# Patient Record
Sex: MALE | Race: WHITE | NOT HISPANIC OR LATINO | Employment: PART TIME | URBAN - METROPOLITAN AREA
[De-identification: names, ages, dates, MRNs, and addresses within clinical notes are randomized per-mention and may not be internally consistent; named-entity substitution may affect disease eponyms.]

---
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
## 2019-01-07 ENCOUNTER — TELEPHONE (OUTPATIENT)
Dept: PEDIATRICS | Facility: CLINIC | Age: 19
End: 2019-01-07

## 2019-01-07 NOTE — TELEPHONE ENCOUNTER
----- Message from Kusum Cagle sent at 1/7/2019  4:10 PM CST -----  Contact: Grandmother Esther Reardon [phone]  She is requesting the record of his sickle cell shot papers.  Please fax it to [phone].  Thank you!

## 2019-01-07 NOTE — TELEPHONE ENCOUNTER
Informed gmaw labs are going to be faxed over to number provided     gmaw Confirmed understanding     No further questions

## 2019-01-11 ENCOUNTER — OFFICE VISIT (OUTPATIENT)
Dept: PEDIATRICS | Facility: CLINIC | Age: 19
End: 2019-01-11
Payer: COMMERCIAL

## 2019-01-11 VITALS
WEIGHT: 173.94 LBS | HEART RATE: 65 BPM | SYSTOLIC BLOOD PRESSURE: 122 MMHG | DIASTOLIC BLOOD PRESSURE: 82 MMHG | RESPIRATION RATE: 18 BRPM | HEIGHT: 70 IN | BODY MASS INDEX: 24.9 KG/M2 | TEMPERATURE: 99 F

## 2019-01-11 DIAGNOSIS — Z00.00 WELL ADULT EXAM: Primary | ICD-10-CM

## 2019-01-11 PROCEDURE — 90633 HEPATITIS A VACCINE PEDIATRIC / ADOLESCENT 2 DOSE IM: ICD-10-PCS | Mod: S$GLB,,, | Performed by: PEDIATRICS

## 2019-01-11 PROCEDURE — 90633 HEPA VACC PED/ADOL 2 DOSE IM: CPT | Mod: S$GLB,,, | Performed by: PEDIATRICS

## 2019-01-11 PROCEDURE — 90471 IMMUNIZATION ADMIN: CPT | Mod: S$GLB,,, | Performed by: PEDIATRICS

## 2019-01-11 PROCEDURE — 90686 FLU VACCINE (QUAD) GREATER THAN OR EQUAL TO 3YO PRESERVATIVE FREE IM: ICD-10-PCS | Mod: S$GLB,,, | Performed by: PEDIATRICS

## 2019-01-11 PROCEDURE — 99999 PR PBB SHADOW E&M-EST. PATIENT-LVL IV: CPT | Mod: PBBFAC,,, | Performed by: PEDIATRICS

## 2019-01-11 PROCEDURE — 90471 FLU VACCINE (QUAD) GREATER THAN OR EQUAL TO 3YO PRESERVATIVE FREE IM: ICD-10-PCS | Mod: S$GLB,,, | Performed by: PEDIATRICS

## 2019-01-11 PROCEDURE — 90472 HEPATITIS A VACCINE PEDIATRIC / ADOLESCENT 2 DOSE IM: ICD-10-PCS | Mod: S$GLB,,, | Performed by: PEDIATRICS

## 2019-01-11 PROCEDURE — 90472 IMMUNIZATION ADMIN EACH ADD: CPT | Mod: S$GLB,,, | Performed by: PEDIATRICS

## 2019-01-11 PROCEDURE — 99999 PR PBB SHADOW E&M-EST. PATIENT-LVL IV: ICD-10-PCS | Mod: PBBFAC,,, | Performed by: PEDIATRICS

## 2019-01-11 PROCEDURE — 99395 PR PREVENTIVE VISIT,EST,18-39: ICD-10-PCS | Mod: 25,S$GLB,, | Performed by: PEDIATRICS

## 2019-01-11 PROCEDURE — 90686 IIV4 VACC NO PRSV 0.5 ML IM: CPT | Mod: S$GLB,,, | Performed by: PEDIATRICS

## 2019-01-11 PROCEDURE — 99395 PREV VISIT EST AGE 18-39: CPT | Mod: 25,S$GLB,, | Performed by: PEDIATRICS

## 2019-01-11 NOTE — PROGRESS NOTES
Here for well check   No concerns  ALL:reviewed  MEDS:reviewed  IMM: need hep A #2  PMH: problem list reviewed  FH: reviewed  LEAD & TB risk: negative  Home: lives with parents and sisters  Education: attends [employer]   Acitvities:plans to try out for football  Diet: good appetite, variety of foods, milk, water, occ. Sugary drinks  Dental: yes  Driving: yes  Drugs/Etoh/Tobacco: occ. Alcohol, has smoked weed prior  Sex: yes  ROS   GEN:sleeps well, no fever or wt loss   SKIN:no infection, rash, bruising or swelling   HEENT:hears and sees well, no eye, ear, nose d/c or pain, no ST, neck injury, pain or masses   CHEST:normal breathing, no cough or CP with exertion   CV:no fatigue, cyanosis, dizziness, palpitations   ABD:nl BMs; no vomiting,no diarrhea,no pain    :nl urination, no dysuria, blood or frequency   GYN:no genital problems   MS:nl movements and gait, no swelling or pain   NEURO:no HA, weakness, incoordination, concussion Hx or spells   PSYCH:no behavior problem, depression, anxiety    PHYSICAL:nl VS See Growth Chart.   GEN: alert, active, cooperative.Pain 0/10    SKIN:no rash, pallor, bruising or edema   HEAD:NCAT   EYE:EOMI, PERRLA, clear conjunctiva   EAR:clear canals, nl pinnae and TMs   NOSE:patent, no d/c, midline septum   MOUTH:nl teeth and gums, clear pharynx   NECK:nl ROM, no mass or thyromegaly   CHEST:nl chest wall, resp effort, clear BBS   CV:RRR, no murmur, nl S1S2, no edema   ABD:nl BS, ND, soft, NT; no HSM, mass    :nl anatomy, no mass or hernia    MS:nl ROM, no deformity or instability, nl gait, no scoliosis, no CCE   NEURO:nl tone and strength    Addison was seen today for well child.    Diagnoses and all orders for this visit:    Well adult exam  -     C. trachomatis/N. gonorrhoeae by AMP DNA  -     (In Office Administered) Hepatitis A Vaccine (Pediatric/Adolescent) (2 Dose) (IM)  -     Influenza - Quadrivalent (3 years & older) (PF)      Dental hygiene discussed  Nutrition and exercise  reviewed  Interpretive conference conducted.   Immunizations reviewed. Flu vaccine and Hep A #2 today  Vision Passed  F/U annually & prn

## 2021-07-26 ENCOUNTER — IMMUNIZATION (OUTPATIENT)
Dept: FAMILY MEDICINE | Facility: CLINIC | Age: 21
End: 2021-07-26
Payer: OTHER GOVERNMENT

## 2021-07-26 DIAGNOSIS — Z23 NEED FOR VACCINATION: Primary | ICD-10-CM

## 2021-07-26 PROCEDURE — 91300 COVID-19, MRNA, LNP-S, PF, 30 MCG/0.3 ML DOSE VACCINE: ICD-10-PCS | Mod: ,,, | Performed by: FAMILY MEDICINE

## 2021-07-26 PROCEDURE — 0001A COVID-19, MRNA, LNP-S, PF, 30 MCG/0.3 ML DOSE VACCINE: CPT | Mod: CV19,,, | Performed by: FAMILY MEDICINE

## 2021-07-26 PROCEDURE — 91300 COVID-19, MRNA, LNP-S, PF, 30 MCG/0.3 ML DOSE VACCINE: CPT | Mod: ,,, | Performed by: FAMILY MEDICINE

## 2021-07-26 PROCEDURE — 0001A COVID-19, MRNA, LNP-S, PF, 30 MCG/0.3 ML DOSE VACCINE: ICD-10-PCS | Mod: CV19,,, | Performed by: FAMILY MEDICINE

## 2024-10-24 DIAGNOSIS — M25.521 RIGHT ELBOW PAIN: Primary | ICD-10-CM

## 2024-11-06 ENCOUNTER — HOSPITAL ENCOUNTER (OUTPATIENT)
Dept: RADIOLOGY | Facility: HOSPITAL | Age: 24
Discharge: HOME | End: 2024-11-06
Attending: ORTHOPAEDIC SURGERY
Payer: COMMERCIAL

## 2024-11-06 ENCOUNTER — OFFICE VISIT (OUTPATIENT)
Dept: ORTHOPEDICS | Facility: CLINIC | Age: 24
End: 2024-11-06
Payer: COMMERCIAL

## 2024-11-06 VITALS — BODY MASS INDEX: 24.81 KG/M2 | WEIGHT: 177.25 LBS | HEIGHT: 71 IN

## 2024-11-06 DIAGNOSIS — M19.021 ARTHRITIS OF RIGHT ELBOW: ICD-10-CM

## 2024-11-06 DIAGNOSIS — M25.521 RIGHT ELBOW PAIN: Primary | ICD-10-CM

## 2024-11-06 DIAGNOSIS — M25.521 RIGHT ELBOW PAIN: ICD-10-CM

## 2024-11-06 DIAGNOSIS — M19.021 ARTHRITIS OF RIGHT ELBOW: Primary | ICD-10-CM

## 2024-11-06 PROCEDURE — 99999 PR PBB SHADOW E&M-EST. PATIENT-LVL II: CPT | Mod: PBBFAC,,, | Performed by: ORTHOPAEDIC SURGERY

## 2024-11-06 PROCEDURE — 3008F BODY MASS INDEX DOCD: CPT | Mod: CPTII,S$GLB,, | Performed by: ORTHOPAEDIC SURGERY

## 2024-11-06 PROCEDURE — 99213 OFFICE O/P EST LOW 20 MIN: CPT | Mod: S$GLB,,, | Performed by: ORTHOPAEDIC SURGERY

## 2024-11-06 PROCEDURE — 73080 X-RAY EXAM OF ELBOW: CPT | Mod: 26,RT,, | Performed by: RADIOLOGY

## 2024-11-06 PROCEDURE — 1159F MED LIST DOCD IN RCRD: CPT | Mod: CPTII,S$GLB,, | Performed by: ORTHOPAEDIC SURGERY

## 2024-11-06 PROCEDURE — 73080 X-RAY EXAM OF ELBOW: CPT | Mod: TC,PO,RT

## 2024-11-06 NOTE — PROGRESS NOTES
24 years old has had pain and limited motion in his right elbow for the past several years.  Pain is 0 good days 7 on bad days made worse with bench press and pull-ups.      Exam shows he lacks full extension and full flexion of the elbow, pronation and supination is full, hand is functioning well no deficits    X-rays show degenerative changes     Assessment:  Right elbow arthrosis     Plan:  Physical therapy, elbow sleeve, we did discuss with him the possibility of arthroscopic debridement with mixed results